# Patient Record
Sex: MALE | Race: BLACK OR AFRICAN AMERICAN | Employment: FULL TIME | URBAN - METROPOLITAN AREA
[De-identification: names, ages, dates, MRNs, and addresses within clinical notes are randomized per-mention and may not be internally consistent; named-entity substitution may affect disease eponyms.]

---

## 2024-08-31 ENCOUNTER — HOSPITAL ENCOUNTER (EMERGENCY)
Age: 24
Discharge: HOME | End: 2024-08-31
Attending: EMERGENCY MEDICINE
Payer: COMMERCIAL

## 2024-08-31 VITALS
WEIGHT: 248 LBS | TEMPERATURE: 98.5 F | SYSTOLIC BLOOD PRESSURE: 128 MMHG | OXYGEN SATURATION: 99 % | RESPIRATION RATE: 18 BRPM | HEART RATE: 86 BPM | DIASTOLIC BLOOD PRESSURE: 86 MMHG | HEIGHT: 72 IN | BODY MASS INDEX: 33.59 KG/M2

## 2024-08-31 DIAGNOSIS — S63.501A SPRAIN OF RIGHT WRIST, INITIAL ENCOUNTER: Primary | ICD-10-CM

## 2024-08-31 PROCEDURE — 99282 EMERGENCY DEPT VISIT SF MDM: CPT

## 2024-08-31 ASSESSMENT — PAIN SCALES - GENERAL: PAINLEVEL_OUTOF10: 3

## 2024-08-31 ASSESSMENT — LIFESTYLE VARIABLES
HOW MANY STANDARD DRINKS CONTAINING ALCOHOL DO YOU HAVE ON A TYPICAL DAY: PATIENT DOES NOT DRINK
HOW OFTEN DO YOU HAVE A DRINK CONTAINING ALCOHOL: NEVER

## 2024-08-31 ASSESSMENT — PAIN - FUNCTIONAL ASSESSMENT: PAIN_FUNCTIONAL_ASSESSMENT: 0-10

## 2024-08-31 NOTE — ED PROVIDER NOTES
[facility] ED  EMERGENCY DEPARTMENT ENCOUNTER      Pt Name: Gonzalez Giordano  MRN: [id number]  Birthdate 2000  Date of evaluation: 8/31/24      CHIEF COMPLAINT       Chief Complaint   Patient presents with    Wrist Pain     Right WC         HISTORY OF PRESENT ILLNESS   HPI 24 y.o. male presents with c/o r. Wrist pain  Sprained wrist last night.  Patient reports that around 1 AM he was at work he was lifting a cover off of the machine that was jammed with some other working staff after that he developed pain in the ulnar aspect of his right wrist.  He went to an outside hospital they did an x-ray there is no fracture they put him in a wrist splint he started his workman comp paperwork, but then he was informed by the working staff that they did not have a contract with that emergency department and he had to come to our emergency department to get his paperwork completed.  He denies any new injury or concerns.  He just wants to make sure he has all of his paperwork completed appropriately.      REVIEW OF SYSTEMS       Review of Systems    PAST MEDICAL HISTORY   History reviewed. No pertinent past medical history.    SURGICAL HISTORY     History reviewed. No pertinent surgical history.    CURRENT MEDICATIONS       There are no discharge medications for this patient.      ALLERGIES     is allergic to zithromax [azithromycin].    FAMILY HISTORY     has no family status information on file.        SOCIAL HISTORY          PHYSICAL EXAM     INITIAL VITALS: /86   Pulse 86   Temp 98.5 °F (36.9 °C)   Resp 18   Ht 1.829 m (6')   Wt 112.5 kg (248 lb)   SpO2 99%   BMI 33.63 kg/m²   Gen: NAD  CVS: RRR, radial pulses appropriate  MSK: Patient has tenderness over the ulnar aspect of the right wrist he has appropriate Halfprin hand  strength, he has appropriate flexion extension at the MCP PIP and DIP joints he has appropriate apposition of his digits.  NEURO: A&OX3, fluent speech appropriate sensation over  the median radial and ulnar dermatomes.    MEDICAL DECISION MAKIN-year-old presenting with ulnar wrist pain, I reviewed the outside hospital x-rays and that showed no fractures.  The patient is neurovascularly intact.  We have completed appropriate Workmen's Comp. paperwork.  Patient has been prescribed analgesics.  Recommended close follow up with primary care and his companies occupational health providers, return to ED if symptoms worsen, and warning precautions provided.   Procedures      DATA FOR LAB AND RADIOLOGY TESTS ORDERED BELOW ARE REVIEWED BY THE ED CLINICIAN:    RADIOLOGY: All x-rays, CT, MRI, and formal ultrasound images (except ED bedside ultrasound) are read by the radiologist, see reports below, unless otherwise noted in MDM or here.  Reports below are reviewed by myself.  XR WRIST RT MIN 3 VWS     HISTORY: Right wrist pain status post work injury     COMPARISON: None     FINDINGS:     No acute fracture or dislocation.  Joint spaces appear well-preserved.  No obvious soft tissue abnormality.       IMPRESSION:     * No acute osseous abnormality.   *  If there is persistent pain over the anatomic snuff box, consider repeat radiographic evaluation in 7-10 days.   Vitals Reviewed:    Vitals:    24 1516   BP: 128/86   Pulse: 86   Resp: 18   Temp: 98.5 °F (36.9 °C)   SpO2: 99%   Weight: 112.5 kg (248 lb)   Height: 1.829 m (6')     MEDICATIONS GIVEN TO PATIENT THIS ENCOUNTER:  No orders of the defined types were placed in this encounter.    DISCHARGE PRESCRIPTIONS:  There are no discharge medications for this patient.    PHYSICIAN CONSULTS ORDERED THIS ENCOUNTER:  None     FINAL IMPRESSION      1. Sprain of right wrist, initial encounter          DISPOSITION/PLAN   DISPOSITION Decision To Discharge 2024 04:03:58 PM  Condition at Disposition: Stable      PATIENT REFERRED TO:  Ruslan Haskins, DO  [address] [facility] [address]  [facility] [address]  [phone]    In 1 week      [facility]

## 2024-08-31 NOTE — ED NOTES
Mode of arrival (squad #, walk in, police, etc) : Walk-in        Chief complaint(s): Right wrist injury        Arrival Note (brief scenario, treatment PTA, etc).: Pt states he was at work, around 2am pt states he was loading things and sprained his wrist. Pt had the job site nurse check him out and stated he should go get x-rays done. Pt states he went to [facility] and they did not seen anything broken on the x-ray but pt stated that he was suppose to come to [place] and not [facility] to receive treatment.         C= \"Have you ever felt that you should Cut down on your drinking?\"  No  A= \"Have people Annoyed you by criticizing your drinking?\"  No  G= \"Have you ever felt bad or Guilty about your drinking?\"  No  E= \"Have you ever had a drink as an Eye-opener first thing in the morning to steady your nerves or to help a hangover?\"  No      Deferred []      Reason for deferring: N/A    *If yes to two or more: probable alcohol abuse.*